# Patient Record
Sex: MALE | Race: WHITE | Employment: PART TIME | URBAN - METROPOLITAN AREA
[De-identification: names, ages, dates, MRNs, and addresses within clinical notes are randomized per-mention and may not be internally consistent; named-entity substitution may affect disease eponyms.]

---

## 2018-11-20 ENCOUNTER — APPOINTMENT (OUTPATIENT)
Dept: GENERAL RADIOLOGY | Age: 23
End: 2018-11-20

## 2018-11-20 ENCOUNTER — HOSPITAL ENCOUNTER (EMERGENCY)
Age: 23
Discharge: HOME | End: 2018-11-20
Attending: EMERGENCY MEDICINE

## 2018-11-20 VITALS
WEIGHT: 160 LBS | HEART RATE: 84 BPM | SYSTOLIC BLOOD PRESSURE: 124 MMHG | HEIGHT: 74 IN | TEMPERATURE: 98.4 F | DIASTOLIC BLOOD PRESSURE: 78 MMHG | BODY MASS INDEX: 20.53 KG/M2 | RESPIRATION RATE: 16 BRPM | OXYGEN SATURATION: 100 %

## 2018-11-20 DIAGNOSIS — S51.011A ELBOW LACERATION, RIGHT, INITIAL ENCOUNTER: ICD-10-CM

## 2018-11-20 DIAGNOSIS — S60.222A CONTUSION OF LEFT HAND, INITIAL ENCOUNTER: Primary | ICD-10-CM

## 2018-11-20 PROCEDURE — 12002 RPR S/N/AX/GEN/TRNK2.6-7.5CM: CPT

## 2018-11-20 PROCEDURE — 73130 X-RAY EXAM OF HAND: CPT

## 2018-11-20 PROCEDURE — 99283 EMERGENCY DEPT VISIT LOW MDM: CPT

## 2018-11-20 RX ORDER — LIDOCAINE HYDROCHLORIDE 10 MG/ML
INJECTION, SOLUTION INFILTRATION; PERINEURAL
Status: DISCONTINUED
Start: 2018-11-20 | End: 2018-11-20 | Stop reason: HOSPADM

## 2018-11-20 ASSESSMENT — PAIN DESCRIPTION - LOCATION: LOCATION: HAND

## 2018-11-20 ASSESSMENT — PAIN DESCRIPTION - ORIENTATION: ORIENTATION: LEFT

## 2018-11-20 ASSESSMENT — PAIN SCALES - GENERAL: PAINLEVEL_OUTOF10: 8

## 2018-11-20 ASSESSMENT — PAIN DESCRIPTION - DESCRIPTORS: DESCRIPTORS: ACHING

## 2018-11-20 ASSESSMENT — PAIN DESCRIPTION - PAIN TYPE: TYPE: ACUTE PAIN

## 2018-11-20 ASSESSMENT — PAIN DESCRIPTION - FREQUENCY: FREQUENCY: CONTINUOUS
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
## 2021-05-03 ENCOUNTER — HOSPITAL ENCOUNTER (EMERGENCY)
Age: 26
Discharge: HOME | End: 2021-05-04
Attending: EMERGENCY MEDICINE

## 2021-05-03 ENCOUNTER — APPOINTMENT (OUTPATIENT)
Dept: CT IMAGING | Age: 26
End: 2021-05-03

## 2021-05-03 DIAGNOSIS — G40.909 SEIZURE DISORDER (HCC): Primary | ICD-10-CM

## 2021-05-03 LAB
BASOPHILS ABSOLUTE: 0.09 E9/L (ref 0–0.2)
BASOPHILS RELATIVE PERCENT: 0.8 % (ref 0–2)
EOSINOPHILS ABSOLUTE: 0.12 E9/L (ref 0.05–0.5)
EOSINOPHILS RELATIVE PERCENT: 1 % (ref 0–6)
HCT VFR BLD CALC: 43.2 % (ref 37–54)
HEMOGLOBIN: 15.2 G/DL (ref 12.5–16.5)
IMMATURE GRANULOCYTES #: 0.05 E9/L
IMMATURE GRANULOCYTES %: 0.4 % (ref 0–5)
LACTIC ACID: 1.5 MMOL/L (ref 0.5–2.2)
LYMPHOCYTES ABSOLUTE: 1.58 E9/L (ref 1.5–4)
LYMPHOCYTES RELATIVE PERCENT: 13.3 % (ref 20–42)
MCH RBC QN AUTO: 33 PG (ref 26–35)
MCHC RBC AUTO-ENTMCNC: 35.2 % (ref 32–34.5)
MCV RBC AUTO: 93.9 FL (ref 80–99.9)
MONOCYTES ABSOLUTE: 0.93 E9/L (ref 0.1–0.95)
MONOCYTES RELATIVE PERCENT: 7.8 % (ref 2–12)
NEUTROPHILS ABSOLUTE: 9.09 E9/L (ref 1.8–7.3)
NEUTROPHILS RELATIVE PERCENT: 76.7 % (ref 43–80)
PDW BLD-RTO: 12.8 FL (ref 11.5–15)
PLATELET # BLD: 297 E9/L (ref 130–450)
PMV BLD AUTO: 9.1 FL (ref 7–12)
RBC # BLD: 4.6 E12/L (ref 3.8–5.8)
WBC # BLD: 11.9 E9/L (ref 4.5–11.5)

## 2021-05-03 PROCEDURE — 85025 COMPLETE CBC W/AUTO DIFF WBC: CPT

## 2021-05-03 PROCEDURE — 93005 ELECTROCARDIOGRAM TRACING: CPT | Performed by: EMERGENCY MEDICINE

## 2021-05-03 PROCEDURE — 83605 ASSAY OF LACTIC ACID: CPT

## 2021-05-03 PROCEDURE — 6360000002 HC RX W HCPCS: Performed by: EMERGENCY MEDICINE

## 2021-05-03 PROCEDURE — 80053 COMPREHEN METABOLIC PANEL: CPT

## 2021-05-03 PROCEDURE — 70450 CT HEAD/BRAIN W/O DYE: CPT

## 2021-05-03 PROCEDURE — 99283 EMERGENCY DEPT VISIT LOW MDM: CPT

## 2021-05-03 PROCEDURE — 96365 THER/PROPH/DIAG IV INF INIT: CPT

## 2021-05-03 RX ORDER — 0.9 % SODIUM CHLORIDE 0.9 %
1000 INTRAVENOUS SOLUTION INTRAVENOUS ONCE
Status: COMPLETED | OUTPATIENT
Start: 2021-05-03 | End: 2021-05-04

## 2021-05-03 RX ORDER — LEVETIRACETAM 10 MG/ML
1000 INJECTION INTRAVASCULAR ONCE
Status: COMPLETED | OUTPATIENT
Start: 2021-05-03 | End: 2021-05-04

## 2021-05-03 RX ADMIN — LEVETIRACETAM 1000 MG: 10 INJECTION INTRAVENOUS at 23:30

## 2021-05-03 ASSESSMENT — PAIN DESCRIPTION - FREQUENCY: FREQUENCY: CONTINUOUS

## 2021-05-03 ASSESSMENT — PAIN DESCRIPTION - DESCRIPTORS: DESCRIPTORS: SORE;OTHER (COMMENT)

## 2021-05-03 ASSESSMENT — PAIN DESCRIPTION - LOCATION: LOCATION: BACK

## 2021-05-03 ASSESSMENT — PAIN DESCRIPTION - ONSET: ONSET: SUDDEN

## 2021-05-03 ASSESSMENT — PAIN SCALES - GENERAL: PAINLEVEL_OUTOF10: 7

## 2021-05-03 ASSESSMENT — PAIN DESCRIPTION - PAIN TYPE: TYPE: ACUTE PAIN

## 2021-05-03 ASSESSMENT — PAIN DESCRIPTION - ORIENTATION: ORIENTATION: UPPER

## 2021-05-04 VITALS
RESPIRATION RATE: 16 BRPM | OXYGEN SATURATION: 98 % | HEIGHT: 74 IN | HEART RATE: 95 BPM | SYSTOLIC BLOOD PRESSURE: 135 MMHG | WEIGHT: 155 LBS | TEMPERATURE: 97.6 F | BODY MASS INDEX: 19.89 KG/M2 | DIASTOLIC BLOOD PRESSURE: 86 MMHG

## 2021-05-04 LAB
ALBUMIN SERPL-MCNC: 4.2 G/DL (ref 3.5–5.2)
ALP BLD-CCNC: 89 U/L (ref 40–129)
ALT SERPL-CCNC: 27 U/L (ref 0–40)
ANION GAP SERPL CALCULATED.3IONS-SCNC: 7 MMOL/L (ref 7–16)
AST SERPL-CCNC: 39 U/L (ref 0–39)
BILIRUB SERPL-MCNC: 1 MG/DL (ref 0–1.2)
BUN BLDV-MCNC: 9 MG/DL (ref 6–20)
CALCIUM SERPL-MCNC: 9.4 MG/DL (ref 8.6–10.2)
CHLORIDE BLD-SCNC: 97 MMOL/L (ref 98–107)
CO2: 27 MMOL/L (ref 22–29)
CREAT SERPL-MCNC: 1.2 MG/DL (ref 0.7–1.2)
EKG ATRIAL RATE: 78 BPM
EKG P AXIS: 41 DEGREES
EKG P-R INTERVAL: 136 MS
EKG Q-T INTERVAL: 368 MS
EKG QRS DURATION: 84 MS
EKG QTC CALCULATION (BAZETT): 419 MS
EKG R AXIS: 65 DEGREES
EKG T AXIS: 62 DEGREES
EKG VENTRICULAR RATE: 78 BPM
GFR AFRICAN AMERICAN: >60
GFR NON-AFRICAN AMERICAN: >60 ML/MIN/1.73
GLUCOSE BLD-MCNC: 120 MG/DL (ref 74–99)
POTASSIUM REFLEX MAGNESIUM: 4.8 MMOL/L (ref 3.5–5)
SODIUM BLD-SCNC: 131 MMOL/L (ref 132–146)
TOTAL PROTEIN: 7.3 G/DL (ref 6.4–8.3)

## 2021-05-04 PROCEDURE — 93010 ELECTROCARDIOGRAM REPORT: CPT | Performed by: INTERNAL MEDICINE

## 2021-05-04 PROCEDURE — 2580000003 HC RX 258: Performed by: EMERGENCY MEDICINE

## 2021-05-04 RX ORDER — LEVETIRACETAM 500 MG/1
500 TABLET ORAL 2 TIMES DAILY
Qty: 60 TABLET | Refills: 0 | Status: SHIPPED | OUTPATIENT
Start: 2021-05-04 | End: 2021-07-18 | Stop reason: SDUPTHER

## 2021-05-04 RX ADMIN — SODIUM CHLORIDE 1000 ML: 9 INJECTION, SOLUTION INTRAVENOUS at 00:07

## 2021-05-04 NOTE — ED PROVIDER NOTES
HPI:  5/3/21,   Time: 10:52 PM EDT         Fredrick Rasmussen is a 22 y.o. male presenting to the ED for 2 episodes of seizures according to the girlfriend, beginning over the last few hours ago. The complaint has been intermittent, moderate in severity, and worsened by nothing. Spontaneous resolution after a few minutes but patient remained confused for about 10 minutes thereafter. No fever chills night sweats no chest pain or shortness of breath. No numbness weakness or tingling of the arms or legs and no difficulty with his vision or his speech at this time. Does have family history of seizure disorder-his mother and girlfriend states this happened one other time about 2 months ago. Admits to using Adderall to help him focus for school and admits to smoking marijuana    ROS:   Pertinent positives and negatives are stated within HPI, all other systems reviewed and are negative.  --------------------------------------------- PAST HISTORY ---------------------------------------------  Past Medical History:  has no past medical history on file. Past Surgical History:  has no past surgical history on file. Social History:  reports that he has been smoking cigarettes. He has been smoking about 0.25 packs per day. He has never used smokeless tobacco. He reports current alcohol use. He reports that he does not use drugs. Family History: family history is not on file. The patients home medications have been reviewed. Allergies: Patient has no known allergies.     -------------------------------------------------- RESULTS -------------------------------------------------  All laboratory and radiology results have been personally reviewed by myself   LABS:  Results for orders placed or performed during the hospital encounter of 05/03/21   CBC Auto Differential   Result Value Ref Range    WBC 11.9 (H) 4.5 - 11.5 E9/L    RBC 4.60 3.80 - 5.80 E12/L    Hemoglobin 15.2 12.5 - 16.5 g/dL    Hematocrit 43.2 37.0 - 54.0 %    MCV 93.9 80.0 - 99.9 fL    MCH 33.0 26.0 - 35.0 pg    MCHC 35.2 (H) 32.0 - 34.5 %    RDW 12.8 11.5 - 15.0 fL    Platelets 906 467 - 191 E9/L    MPV 9.1 7.0 - 12.0 fL    Neutrophils % 76.7 43.0 - 80.0 %    Immature Granulocytes % 0.4 0.0 - 5.0 %    Lymphocytes % 13.3 (L) 20.0 - 42.0 %    Monocytes % 7.8 2.0 - 12.0 %    Eosinophils % 1.0 0.0 - 6.0 %    Basophils % 0.8 0.0 - 2.0 %    Neutrophils Absolute 9.09 (H) 1.80 - 7.30 E9/L    Immature Granulocytes # 0.05 E9/L    Lymphocytes Absolute 1.58 1.50 - 4.00 E9/L    Monocytes Absolute 0.93 0.10 - 0.95 E9/L    Eosinophils Absolute 0.12 0.05 - 0.50 E9/L    Basophils Absolute 0.09 0.00 - 0.20 E9/L   Comprehensive Metabolic Panel w/ Reflex to MG   Result Value Ref Range    Sodium 131 (L) 132 - 146 mmol/L    Potassium reflex Magnesium 4.8 3.5 - 5.0 mmol/L    Chloride 97 (L) 98 - 107 mmol/L    CO2 27 22 - 29 mmol/L    Anion Gap 7 7 - 16 mmol/L    Glucose 120 (H) 74 - 99 mg/dL    BUN 9 6 - 20 mg/dL    CREATININE 1.2 0.7 - 1.2 mg/dL    GFR Non-African American >60 >=60 mL/min/1.73    GFR African American >60     Calcium 9.4 8.6 - 10.2 mg/dL    Total Protein 7.3 6.4 - 8.3 g/dL    Albumin 4.2 3.5 - 5.2 g/dL    Total Bilirubin 1.0 0.0 - 1.2 mg/dL    Alkaline Phosphatase 89 40 - 129 U/L    ALT 27 0 - 40 U/L    AST 39 0 - 39 U/L   Lactic Acid, Plasma   Result Value Ref Range    Lactic Acid 1.5 0.5 - 2.2 mmol/L   EKG 12 Lead   Result Value Ref Range    Ventricular Rate 78 BPM    Atrial Rate 78 BPM    P-R Interval 136 ms    QRS Duration 84 ms    Q-T Interval 368 ms    QTc Calculation (Bazett) 419 ms    P Axis 41 degrees    R Axis 65 degrees    T Axis 62 degrees       RADIOLOGY:  Interpreted by Radiologist.  CT Head WO Contrast   Final Result   No acute intracranial abnormality.             ------------------------- NURSING NOTES AND VITALS REVIEWED ---------------------------   The nursing notes within the ED encounter and vital signs as below have been reviewed.    BP (!) 141/92   Pulse 106   Temp 97.6 °F (36.4 °C) (Temporal)   Resp 16   Ht 6' 2\" (1.88 m)   Wt 155 lb (70.3 kg)   SpO2 96%   BMI 19.90 kg/m²   Oxygen Saturation Interpretation: Normal      ---------------------------------------------------PHYSICAL EXAM--------------------------------------      Constitutional/General: Alert and oriented x3, well appearing, non toxic in NAD  Head: NC/AT  Eyes: PERRL, EOMI  Mouth: Oropharynx clear, handling secretions, no trismus  Neck: Supple, full ROM, no meningeal signs  Pulmonary: Lungs clear to auscultation bilaterally, no wheezes, rales, or rhonchi. Not in respiratory distress  Cardiovascular:  Regular rate and rhythm, no murmurs, gallops, or rubs. 2+ distal pulses  Abdomen: Soft, non tender, non distended,   Extremities: Moves all extremities x 4. Warm and well perfused  Skin: warm and dry without rash  Neurologic: GCS 15,  Psych: Normal Affect      ------------------------------ ED COURSE/MEDICAL DECISION MAKING----------------------  Medications   0.9 % sodium chloride bolus (has no administration in time range)   levETIRAcetam (KEPPRA) 1000 mg/100 mL IVPB (has no administration in time range)         Medical Decision Making:    New onset seizures therefore will give Keppra and do full work-up    Counseling: The emergency provider has spoken with the patient and discussed todays results, in addition to providing specific details for the plan of care and counseling regarding the diagnosis and prognosis. Questions are answered at this time and they are agreeable with the plan.      --------------------------------- IMPRESSION AND DISPOSITION ---------------------------------    IMPRESSION  1.  [facility] New Problem       DISPOSITION  Disposition: Discharge to home  Patient condition is stable      ED course: Patient was asymptomatic during ER visit and will be placed on Keppra because this is his third seizure and referred to neurology            Cristal Mohan, MD  05/04/21 0066

## 2021-05-04 NOTE — ED NOTES
Pt remained seizure free while in the ED. Instructed him to follow up with his PCP for further evaluation and treatment, and not to drive or operate any heavy machinery until cleared by PCP.      Arlene Jules RN  05/04/21 [id number]

## 2021-05-12 ENCOUNTER — HOSPITAL ENCOUNTER (EMERGENCY)
Age: 26
Discharge: HOME | End: 2021-05-12

## 2021-05-12 ENCOUNTER — APPOINTMENT (OUTPATIENT)
Dept: GENERAL RADIOLOGY | Age: 26
End: 2021-05-12

## 2021-05-12 ENCOUNTER — APPOINTMENT (OUTPATIENT)
Dept: CT IMAGING | Age: 26
End: 2021-05-12

## 2021-05-12 VITALS
DIASTOLIC BLOOD PRESSURE: 85 MMHG | SYSTOLIC BLOOD PRESSURE: 129 MMHG | TEMPERATURE: 98.3 F | BODY MASS INDEX: 19.89 KG/M2 | OXYGEN SATURATION: 98 % | RESPIRATION RATE: 17 BRPM | HEIGHT: 74 IN | WEIGHT: 155 LBS | HEART RATE: 83 BPM

## 2021-05-12 DIAGNOSIS — S00.01XA ABRASION OF SCALP, INITIAL ENCOUNTER: ICD-10-CM

## 2021-05-12 DIAGNOSIS — S20.211A RIB CONTUSION, RIGHT, INITIAL ENCOUNTER: ICD-10-CM

## 2021-05-12 DIAGNOSIS — S50.01XA CONTUSION OF RIGHT ELBOW, INITIAL ENCOUNTER: ICD-10-CM

## 2021-05-12 DIAGNOSIS — R51.9 ACUTE NONINTRACTABLE HEADACHE, UNSPECIFIED HEADACHE TYPE: Primary | ICD-10-CM

## 2021-05-12 PROCEDURE — 72125 CT NECK SPINE W/O DYE: CPT

## 2021-05-12 PROCEDURE — 71101 X-RAY EXAM UNILAT RIBS/CHEST: CPT

## 2021-05-12 PROCEDURE — 6360000002 HC RX W HCPCS: Performed by: PHYSICIAN ASSISTANT

## 2021-05-12 PROCEDURE — 96372 THER/PROPH/DIAG INJ SC/IM: CPT

## 2021-05-12 PROCEDURE — 90471 IMMUNIZATION ADMIN: CPT | Performed by: NURSE PRACTITIONER

## 2021-05-12 PROCEDURE — 70450 CT HEAD/BRAIN W/O DYE: CPT

## 2021-05-12 PROCEDURE — 6360000002 HC RX W HCPCS: Performed by: NURSE PRACTITIONER

## 2021-05-12 PROCEDURE — 90715 TDAP VACCINE 7 YRS/> IM: CPT | Performed by: NURSE PRACTITIONER

## 2021-05-12 PROCEDURE — 73070 X-RAY EXAM OF ELBOW: CPT

## 2021-05-12 PROCEDURE — 99283 EMERGENCY DEPT VISIT LOW MDM: CPT

## 2021-05-12 RX ORDER — NAPROXEN 500 MG/1
500 TABLET ORAL 2 TIMES DAILY
Qty: 30 TABLET | Refills: 0 | Status: SHIPPED | OUTPATIENT
Start: 2021-05-12 | End: 2021-05-27

## 2021-05-12 RX ORDER — KETOROLAC TROMETHAMINE 30 MG/ML
30 INJECTION, SOLUTION INTRAMUSCULAR; INTRAVENOUS ONCE
Status: COMPLETED | OUTPATIENT
Start: 2021-05-12 | End: 2021-05-12

## 2021-05-12 RX ADMIN — KETOROLAC TROMETHAMINE 30 MG: 30 INJECTION, SOLUTION INTRAMUSCULAR; INTRAVENOUS at 19:29

## 2021-05-12 RX ADMIN — TETANUS TOXOID, REDUCED DIPHTHERIA TOXOID AND ACELLULAR PERTUSSIS VACCINE, ADSORBED 0.5 ML: 5; 2.5; 8; 8; 2.5 SUSPENSION INTRAMUSCULAR at 17:42

## 2021-05-12 ASSESSMENT — PAIN SCALES - GENERAL
PAINLEVEL_OUTOF10: 6
PAINLEVEL_OUTOF10: 5
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
## 2021-07-17 ENCOUNTER — HOSPITAL ENCOUNTER (EMERGENCY)
Age: 26
Discharge: HOME | End: 2021-07-18
Attending: EMERGENCY MEDICINE

## 2021-07-17 DIAGNOSIS — G40.919 BREAKTHROUGH SEIZURE (HCC): Primary | ICD-10-CM

## 2021-07-17 DIAGNOSIS — Z91.14 NONCOMPLIANCE WITH MEDICATION REGIMEN: ICD-10-CM

## 2021-07-17 PROCEDURE — 96365 THER/PROPH/DIAG IV INF INIT: CPT

## 2021-07-17 PROCEDURE — 99284 EMERGENCY DEPT VISIT MOD MDM: CPT

## 2021-07-17 RX ORDER — 0.9 % SODIUM CHLORIDE 0.9 %
1000 INTRAVENOUS SOLUTION INTRAVENOUS ONCE
Status: COMPLETED | OUTPATIENT
Start: 2021-07-18 | End: 2021-07-18

## 2021-07-17 RX ORDER — LEVETIRACETAM 10 MG/ML
1000 INJECTION INTRAVASCULAR ONCE
Status: COMPLETED | OUTPATIENT
Start: 2021-07-18 | End: 2021-07-18

## 2021-07-17 ASSESSMENT — PAIN SCALES - GENERAL: PAINLEVEL_OUTOF10: 10

## 2021-07-17 ASSESSMENT — PAIN DESCRIPTION - ORIENTATION: ORIENTATION: MID

## 2021-07-17 ASSESSMENT — PAIN DESCRIPTION - DESCRIPTORS: DESCRIPTORS: HEADACHE

## 2021-07-17 ASSESSMENT — PAIN DESCRIPTION - PAIN TYPE: TYPE: ACUTE PAIN

## 2021-07-17 ASSESSMENT — PAIN DESCRIPTION - PROGRESSION: CLINICAL_PROGRESSION: NOT CHANGED

## 2021-07-17 ASSESSMENT — PAIN DESCRIPTION - LOCATION: LOCATION: HEAD

## 2021-07-17 ASSESSMENT — PAIN DESCRIPTION - ONSET: ONSET: SUDDEN

## 2021-07-17 ASSESSMENT — PAIN DESCRIPTION - FREQUENCY: FREQUENCY: CONTINUOUS

## 2021-07-18 ENCOUNTER — APPOINTMENT (OUTPATIENT)
Dept: GENERAL RADIOLOGY | Age: 26
End: 2021-07-18

## 2021-07-18 ENCOUNTER — APPOINTMENT (OUTPATIENT)
Dept: CT IMAGING | Age: 26
End: 2021-07-18

## 2021-07-18 VITALS
WEIGHT: 165 LBS | SYSTOLIC BLOOD PRESSURE: 139 MMHG | HEIGHT: 74 IN | DIASTOLIC BLOOD PRESSURE: 80 MMHG | TEMPERATURE: 97.5 F | BODY MASS INDEX: 21.17 KG/M2 | OXYGEN SATURATION: 99 % | HEART RATE: 100 BPM | RESPIRATION RATE: 17 BRPM

## 2021-07-18 LAB
ACETAMINOPHEN LEVEL: <5 MCG/ML (ref 10–30)
ALBUMIN SERPL-MCNC: 4.8 G/DL (ref 3.5–5.2)
ALP BLD-CCNC: 84 U/L (ref 40–129)
ALT SERPL-CCNC: 21 U/L (ref 0–40)
AMPHETAMINE SCREEN, URINE: POSITIVE
ANION GAP SERPL CALCULATED.3IONS-SCNC: 17 MMOL/L (ref 7–16)
AST SERPL-CCNC: 30 U/L (ref 0–39)
BARBITURATE SCREEN URINE: NOT DETECTED
BASOPHILS ABSOLUTE: 0.07 E9/L (ref 0–0.2)
BASOPHILS RELATIVE PERCENT: 0.9 % (ref 0–2)
BENZODIAZEPINE SCREEN, URINE: NOT DETECTED
BILIRUB SERPL-MCNC: 0.6 MG/DL (ref 0–1.2)
BUN BLDV-MCNC: 5 MG/DL (ref 6–20)
CALCIUM SERPL-MCNC: 9.8 MG/DL (ref 8.6–10.2)
CANNABINOID SCREEN URINE: NOT DETECTED
CHLORIDE BLD-SCNC: 97 MMOL/L (ref 98–107)
CO2: 18 MMOL/L (ref 22–29)
COCAINE METABOLITE SCREEN URINE: NOT DETECTED
CREAT SERPL-MCNC: 1.3 MG/DL (ref 0.7–1.2)
EKG ATRIAL RATE: 122 BPM
EKG P AXIS: 67 DEGREES
EKG P-R INTERVAL: 132 MS
EKG Q-T INTERVAL: 316 MS
EKG QRS DURATION: 80 MS
EKG QTC CALCULATION (BAZETT): 450 MS
EKG R AXIS: 60 DEGREES
EKG T AXIS: 59 DEGREES
EKG VENTRICULAR RATE: 122 BPM
EOSINOPHILS ABSOLUTE: 0.11 E9/L (ref 0.05–0.5)
EOSINOPHILS RELATIVE PERCENT: 1.5 % (ref 0–6)
ETHANOL: 51 MG/DL (ref 0–0.08)
FENTANYL SCREEN, URINE: NOT DETECTED
GFR AFRICAN AMERICAN: >60
GFR NON-AFRICAN AMERICAN: >60 ML/MIN/1.73
GLUCOSE BLD-MCNC: 90 MG/DL (ref 74–99)
HCT VFR BLD CALC: 43.6 % (ref 37–54)
HEMOGLOBIN: 15.4 G/DL (ref 12.5–16.5)
IMMATURE GRANULOCYTES #: 0.05 E9/L
IMMATURE GRANULOCYTES %: 0.7 % (ref 0–5)
LYMPHOCYTES ABSOLUTE: 2.43 E9/L (ref 1.5–4)
LYMPHOCYTES RELATIVE PERCENT: 32.4 % (ref 20–42)
Lab: ABNORMAL
MCH RBC QN AUTO: 31.7 PG (ref 26–35)
MCHC RBC AUTO-ENTMCNC: 35.3 % (ref 32–34.5)
MCV RBC AUTO: 89.7 FL (ref 80–99.9)
METHADONE SCREEN, URINE: NOT DETECTED
MONOCYTES ABSOLUTE: 0.6 E9/L (ref 0.1–0.95)
MONOCYTES RELATIVE PERCENT: 8 % (ref 2–12)
NEUTROPHILS ABSOLUTE: 4.24 E9/L (ref 1.8–7.3)
NEUTROPHILS RELATIVE PERCENT: 56.5 % (ref 43–80)
OPIATE SCREEN URINE: NOT DETECTED
OXYCODONE URINE: NOT DETECTED
PDW BLD-RTO: 13 FL (ref 11.5–15)
PHENCYCLIDINE SCREEN URINE: NOT DETECTED
PLATELET # BLD: 274 E9/L (ref 130–450)
PMV BLD AUTO: 9 FL (ref 7–12)
POTASSIUM SERPL-SCNC: 4.7 MMOL/L (ref 3.5–5)
RBC # BLD: 4.86 E12/L (ref 3.8–5.8)
SALICYLATE, SERUM: <0.3 MG/DL (ref 0–30)
SODIUM BLD-SCNC: 132 MMOL/L (ref 132–146)
TOTAL PROTEIN: 8.1 G/DL (ref 6.4–8.3)
TRICYCLIC ANTIDEPRESSANTS SCREEN SERUM: NEGATIVE NG/ML
TROPONIN, HIGH SENSITIVITY: <6 NG/L (ref 0–11)
WBC # BLD: 7.5 E9/L (ref 4.5–11.5)

## 2021-07-18 PROCEDURE — 6360000002 HC RX W HCPCS: Performed by: EMERGENCY MEDICINE

## 2021-07-18 PROCEDURE — 82077 ASSAY SPEC XCP UR&BREATH IA: CPT

## 2021-07-18 PROCEDURE — 80143 DRUG ASSAY ACETAMINOPHEN: CPT

## 2021-07-18 PROCEDURE — 71045 X-RAY EXAM CHEST 1 VIEW: CPT

## 2021-07-18 PROCEDURE — 85025 COMPLETE CBC W/AUTO DIFF WBC: CPT

## 2021-07-18 PROCEDURE — 84484 ASSAY OF TROPONIN QUANT: CPT

## 2021-07-18 PROCEDURE — 80307 DRUG TEST PRSMV CHEM ANLYZR: CPT

## 2021-07-18 PROCEDURE — 80179 DRUG ASSAY SALICYLATE: CPT

## 2021-07-18 PROCEDURE — 93005 ELECTROCARDIOGRAM TRACING: CPT | Performed by: EMERGENCY MEDICINE

## 2021-07-18 PROCEDURE — 70450 CT HEAD/BRAIN W/O DYE: CPT

## 2021-07-18 PROCEDURE — 80053 COMPREHEN METABOLIC PANEL: CPT

## 2021-07-18 PROCEDURE — 2580000003 HC RX 258: Performed by: EMERGENCY MEDICINE

## 2021-07-18 RX ORDER — LEVETIRACETAM 500 MG/1
500 TABLET ORAL 2 TIMES DAILY
Qty: 60 TABLET | Refills: 0 | Status: SHIPPED | OUTPATIENT
Start: 2021-07-18 | End: 2021-08-17

## 2021-07-18 RX ADMIN — SODIUM CHLORIDE 1000 ML: 9 INJECTION, SOLUTION INTRAVENOUS at 00:43

## 2021-07-18 RX ADMIN — LEVETIRACETAM 1000 MG: 10 INJECTION, SOLUTION INTRAVENOUS at 00:43

## 2021-07-18 NOTE — ED PROVIDER NOTES
HPI:  7/17/21, Time: 11:58 PM EDT         Twin Esteban is a 32 y.o. male presenting to the ED for seizure, beginning short time ago. The complaint has been persistent, moderate in severity, and worsened by nothing. Patient reporting seizure activity while at work. Patient last took [address] over a month ago. Patient reporting no neck or back pain he reports no chest pain he does report some nausea. Patient unsure whether he struck his head. Patient reporting no incontinence he did not bite down on his tongue. Patient reporting no fever no chills no chest pain. Patient reporting no weakness or numbness. He does report taking Adderall that is not prescribed to him. ROS:   Pertinent positives and negatives are stated within HPI, all other systems reviewed and are negative.  --------------------------------------------- PAST HISTORY ---------------------------------------------  Past Medical History:  has a past medical history of Seizures ([facility] [address] Past Surgical History:  has no past surgical history on file. Social History:  reports that he has been smoking cigarettes. He has been smoking about 0.25 packs per day. He has never used smokeless tobacco. He reports current alcohol use. He reports that he does not use drugs. Family History: family history is not on file. The patients home medications have been reviewed. Allergies: Patient has no known allergies. ---------------------------------------------------PHYSICAL EXAM--------------------------------------    Constitutional/General: Alert and oriented x3, well appearing, non toxic in NAD  Head: Normocephalic and atraumatic  Eyes: PERRL, EOMI  Mouth: Oropharynx clear, handling secretions, no trismus  Neck: Supple, full ROM, non tender to palpation in the midline, no stridor, no crepitus, no meningeal signs  Pulmonary: Lungs clear to auscultation bilaterally, no wheezes, rales, or rhonchi.  Not in respiratory distress  Cardiovascular: Tachycardic. Regular rhythm. No murmurs, gallops, or rubs. 2+ distal pulses  Chest: no chest wall tenderness  Abdomen: Soft. Non tender. Non distended. +BS. No rebound, guarding, or rigidity. No pulsatile masses appreciated. Musculoskeletal: Moves all extremities x 4. Warm and well perfused, no clubbing, cyanosis, or edema. Capillary refill <3 seconds  Skin: warm and dry. No rashes. Neurologic: GCS 15, CN 2-12 grossly intact, no focal deficits, symmetric strength 5/5 in the upper and lower extremities bilaterally  Psych: Normal Affect    -------------------------------------------------- RESULTS -------------------------------------------------  I have personally reviewed all laboratory and imaging results for this patient. Results are listed below.      LABS:  Results for orders placed or performed during the hospital encounter of 07/17/21   CBC auto differential   Result Value Ref Range    WBC 7.5 4.5 - 11.5 E9/L    RBC 4.86 3.80 - 5.80 E12/L    Hemoglobin 15.4 12.5 - 16.5 g/dL    Hematocrit 43.6 37.0 - 54.0 %    MCV 89.7 80.0 - 99.9 fL    MCH 31.7 26.0 - 35.0 pg    MCHC 35.3 (H) 32.0 - 34.5 %    RDW 13.0 11.5 - 15.0 fL    Platelets 080 377 - 098 E9/L    MPV 9.0 7.0 - 12.0 fL    Neutrophils % 56.5 43.0 - 80.0 %    Immature Granulocytes % 0.7 0.0 - 5.0 %    Lymphocytes % 32.4 20.0 - 42.0 %    Monocytes % 8.0 2.0 - 12.0 %    Eosinophils % 1.5 0.0 - 6.0 %    Basophils % 0.9 0.0 - 2.0 %    Neutrophils Absolute 4.24 1.80 - 7.30 E9/L    Immature Granulocytes # 0.05 E9/L    Lymphocytes Absolute 2.43 1.50 - 4.00 E9/L    Monocytes Absolute 0.60 0.10 - 0.95 E9/L    Eosinophils Absolute 0.11 0.05 - 0.50 E9/L    Basophils Absolute 0.07 0.00 - 0.20 E9/L   Comprehensive Metabolic Panel   Result Value Ref Range    Sodium 132 132 - 146 mmol/L    Potassium 4.7 3.5 - 5.0 mmol/L    Chloride 97 (L) 98 - 107 mmol/L    CO2 18 (L) 22 - 29 mmol/L    Anion Gap 17 (H) 7 - 16 mmol/L    Glucose 90 74 - 99 mg/dL    BUN 5 (L) 6 - 20 mg/dL CREATININE 1.3 (H) 0.7 - 1.2 mg/dL    GFR Non-African American >60 >=60 mL/min/1.73    GFR African American >60     Calcium 9.8 8.6 - 10.2 mg/dL    Total Protein 8.1 6.4 - 8.3 g/dL    Albumin 4.8 3.5 - 5.2 g/dL    Total Bilirubin 0.6 0.0 - 1.2 mg/dL    Alkaline Phosphatase 84 40 - 129 U/L    ALT 21 0 - 40 U/L    AST 30 0 - 39 U/L   Troponin   Result Value Ref Range    Troponin, High Sensitivity <6 0 - 11 ng/L   Serum Drug Screen   Result Value Ref Range    Ethanol Lvl 51 mg/dL    Acetaminophen Level <5.0 (L) 10.0 - 68.7 mcg/mL    Salicylate, Serum <5.9 0.0 - 30.0 mg/dL    TCA Scrn NEGATIVE Cutoff:300 ng/mL   Urine Drug Screen   Result Value Ref Range    Amphetamine Screen, Urine POSITIVE (A) Negative <1000 ng/mL    Barbiturate Screen, Ur NOT DETECTED Negative < 200 ng/mL    Benzodiazepine Screen, Urine NOT DETECTED Negative < 200 ng/mL    Cannabinoid Scrn, Ur NOT DETECTED Negative < 50ng/mL    Cocaine Metabolite Screen, Urine NOT DETECTED Negative < 300 ng/mL    Opiate Scrn, Ur NOT DETECTED Negative < 300ng/mL    PCP Screen, Urine NOT DETECTED Negative < 25 ng/mL    Methadone Screen, Urine NOT DETECTED Negative <300 ng/mL    Oxycodone Urine NOT DETECTED Negative <100 ng/mL    FENTANYL SCREEN, URINE NOT DETECTED Negative <1 ng/mL    Drug Screen Comment: see below    EKG 12 Lead   Result Value Ref Range    Ventricular Rate 122 BPM    Atrial Rate 122 BPM    P-R Interval 132 ms    QRS Duration 80 ms    Q-T Interval 316 ms    QTc Calculation (Bazett) 450 ms    P Axis 67 degrees    R Axis 60 degrees    T Axis 59 degrees       RADIOLOGY:  Interpreted by Radiologist.  CT HEAD WO CONTRAST   Final Result   No acute intracranial abnormality. No basilar skull nor calvarial fracture. XR CHEST PORTABLE    (Results Pending)         EKG: This EKG is signed and interpreted by me.     Rate: 122  Rhythm: Sinus tachycardia  Interpretation: no acute changes  Comparison: no previous EKG available    This X-Ray is independently viewed and interpreted by me:   - Study: Chest X-Ray   - Number of Views: 1  - Findings: Mediastinum is normal, No infiltrate, No effusion, No cardiomegaly and No pneumothorax    ------------------------- NURSING NOTES AND VITALS REVIEWED ---------------------------   The nursing notes within the ED encounter and vital signs as below have been reviewed by myself. /75   Pulse 115   Temp 97.5 °F (36.4 °C) (Temporal)   Resp 16   Ht 6' 2\" (1.88 m)   Wt 165 lb (74.8 kg)   SpO2 98%   BMI 21.18 kg/m²   Oxygen Saturation Interpretation: Normal    The patients available past medical records and past encounters were reviewed. ------------------------------ ED COURSE/MEDICAL DECISION MAKING----------------------  Medications   0.9 % sodium chloride bolus (1,000 mLs Intravenous New Bag 7/18/21 0043)   levETIRAcetam (KEPPRA) 1000 mg/100 mL IVPB (0 mg Intravenous Stopped 7/18/21 0132)             Medical Decision Making:    Resenting here because of seizure. Patient has known history of seizure. Patient reportedly had seizure while at work. Patient reports he has not been taking his Keppra as prescribed. Patient reports he stopped taking about a month ago. Patient reporting some mild cough he reports no chest pain patient was mildly tachycardic on arrival.  Patient reporting no abdominal pain. Patient reporting no numbness or tingling in his upper or lower extremities there is no neck or back pain. Labs noted reviewed. Patient ordered Keppra here. Patient remained stable. Patient will be discharged home    Re-Evaluations:             Re-evaluation. Patients symptoms show no change  Patient reevaluated patient made aware of findings and plan. Patient no distress. Patient neurologically intact. Reporting no neck or back pain. Plan will be to discharge    Consultations:                 Critical Care:          This patient's ED course included: a personal history and physicial eaxmination    This patient has been closely monitored during their ED course. Counseling: The emergency provider has spoken with the patient and discussed todays results, in addition to providing specific details for the plan of care and counseling regarding the diagnosis and prognosis. Questions are answered at this time and they are agreeable with the plan.       --------------------------------- IMPRESSION AND DISPOSITION ---------------------------------    IMPRESSION  1. Breakthrough seizure ([address]    2. Noncompliance with medication regimen        DISPOSITION  Disposition: Discharge home  Patient condition is stable        NOTE: This report was transcribed using voice recognition software.  Every effort was made to ensure accuracy; however, inadvertent computerized transcription errors may be present          Dean Seth MD  07/17/21 Maria Dolores Holley MD  07/18/21 [address] Glen Holley MD  07/18/21 0839

## 2021-07-18 NOTE — ED NOTES
Bed: 24  Expected date:   Expected time:   Means of arrival: [facility]  Comments:  MIRI Julian RN  07/17/21 0493